# Patient Record
Sex: MALE | Race: WHITE | URBAN - METROPOLITAN AREA
[De-identification: names, ages, dates, MRNs, and addresses within clinical notes are randomized per-mention and may not be internally consistent; named-entity substitution may affect disease eponyms.]

---

## 2022-07-05 ENCOUNTER — HOSPITAL ENCOUNTER (OUTPATIENT)
Age: 67
Discharge: HOME | End: 2022-07-07

## 2022-07-05 PROCEDURE — 88305 TISSUE EXAM BY PATHOLOGIST: CPT

## 2024-12-26 ENCOUNTER — HOSPITAL ENCOUNTER (EMERGENCY)
Age: 69
Discharge: HOME | End: 2024-12-26
Payer: MEDICARE

## 2024-12-26 VITALS
SYSTOLIC BLOOD PRESSURE: 173 MMHG | OXYGEN SATURATION: 95 % | TEMPERATURE: 98.1 F | HEART RATE: 76 BPM | RESPIRATION RATE: 18 BRPM | HEIGHT: 72 IN | BODY MASS INDEX: 42.66 KG/M2 | WEIGHT: 315 LBS | DIASTOLIC BLOOD PRESSURE: 97 MMHG

## 2024-12-26 DIAGNOSIS — H60.11 CELLULITIS OF RIGHT EXTERNAL EAR: Primary | ICD-10-CM

## 2024-12-26 PROCEDURE — 99211 OFF/OP EST MAY X REQ PHY/QHP: CPT

## 2024-12-26 RX ORDER — DOXYCYCLINE HYCLATE 100 MG
100 TABLET ORAL 2 TIMES DAILY
Qty: 20 TABLET | Refills: 0 | Status: SHIPPED | OUTPATIENT
Start: 2024-12-26 | End: 2025-01-05

## 2024-12-26 RX ORDER — PREDNISOLONE 5 MG/1
TABLET ORAL
COMMUNITY

## 2024-12-26 RX ORDER — VALACYCLOVIR HYDROCHLORIDE 1 G/1
1000 TABLET, FILM COATED ORAL 2 TIMES DAILY
COMMUNITY

## 2024-12-26 ASSESSMENT — PAIN DESCRIPTION - LOCATION: LOCATION: EAR

## 2024-12-26 ASSESSMENT — PAIN SCALES - GENERAL: PAINLEVEL_OUTOF10: 3

## 2024-12-26 ASSESSMENT — PAIN - FUNCTIONAL ASSESSMENT: PAIN_FUNCTIONAL_ASSESSMENT: 0-10

## 2024-12-26 ASSESSMENT — PAIN DESCRIPTION - ORIENTATION: ORIENTATION: RIGHT

## 2024-12-26 NOTE — ED PROVIDER NOTES
PROCEDURES   none    REASSESSMENT   12/26/24       Time: 1335  Patient’s condition remains unchanged and remains stable.    CONSULTS:  None    DIFFERENTIAL DX_MDM   MDM:   Social Determinants: None    Records Reviewed: Source, electronic medical record    CC/HPI Summary, DDx, ED Course, Impression: Patient presents with Facial Swelling (Right ear red/swollen/painful )   Allen Mccain is a 69 y.o. male who presents to the ED with complaints of right ear redness and swelling that has been ongoing for the past 3 days.  Patient states this has occurred approximately 12 years ago in the past.  Patient states he was started on doxycycline at that time for that and it improved.  Patient states he was told it was cellulitis.  Patient denies difficulty in hearing from the right ear.  Patient denies dizziness.  Patient denies any recent injuries to the right ear.  Patient has no other complaints of pain.  Patient denies fever or chills.      Impression is cellulitis of the right external ear.  Plan is to discharge patient to home and have him follow-up with his family doctor this week.  Patient is to be started on doxycycline 100 mg twice daily for the next 10 days.  Patient is to take Tylenol as needed for pain and/or fever.  Patient has been provided with red flags on when to return to the emergency department including increased redness swelling, and/or fever.    I have spoken with the patient/caregiver and discussed today’s results, in addition to providing specific details for the plan of care and counseling regarding the diagnosis and prognosis and are agreeable with the plan. All results reviewed with pt and all questions answered.  I discussed the differential, results and discharge plan with the patient and/or family/friend/caregiver if present.  I emphasized the importance of follow-up with the physician I referred them to in the timeframe recommended.  I explained reasons for the patient to return to the Emergency

## 2025-06-23 ENCOUNTER — TRANSCRIBE ORDERS (OUTPATIENT)
Dept: ADMINISTRATIVE | Age: 70
End: 2025-06-23

## 2025-06-23 DIAGNOSIS — R06.09 DYSPNEA ON EXERTION: Primary | ICD-10-CM

## 2025-07-14 ENCOUNTER — HOSPITAL ENCOUNTER (OUTPATIENT)
Dept: PULMONOLOGY | Age: 70
Discharge: HOME | End: 2025-07-14
Attending: INTERNAL MEDICINE

## 2025-07-14 DIAGNOSIS — R06.09 DYSPNEA ON EXERTION: ICD-10-CM

## 2025-07-18 ENCOUNTER — HOSPITAL ENCOUNTER (OUTPATIENT)
Dept: PULMONOLOGY | Age: 70
Discharge: HOME | End: 2025-07-18
Attending: INTERNAL MEDICINE
Payer: MEDICARE

## 2025-07-18 DIAGNOSIS — R06.09 DYSPNEA ON EXERTION: ICD-10-CM

## 2025-07-18 PROCEDURE — 94070 EVALUATION OF WHEEZING: CPT

## 2025-07-18 NOTE — PROCEDURES
[address] [address]                           PULMONARY FUNCTION      PATIENT NAME: LAZARO POTTS                  : 1955  MED REC NO: [id number]                        ROOM:   ACCOUNT NO: [id number]                       ADMIT DATE: 2025  PROVIDER: Elsi Renae MD      DATE OF PROCEDURE: 2025    SURGEON:  Elsi Renae MD    REFERRING PHYSICIAN:  BRAD JIMÉNEZ    STUDY PERFORMED:  Methacholine provocation test.    DIAGNOSIS:  R06.9, dyspnea on exertion.    FINDINGS:  Test was of good quality.  Baseline spirometry revealed mildly reduced FVC with normal FEV1.  Normal FEV1/FVC ratio.  There was no decline after saline aerosol.  Methacholine was then given in 5 stages at 25 mg/mL, maximum decline was only 18%.    IMPRESSION:  Negative methacholine provocation test.          ELSI RENAE MD      D:  2025 11:21:23     T:  2025 12:02:08     EMIR/SHALA  Job #:  [id number]     Doc#:  [id number]